# Patient Record
Sex: MALE | Race: BLACK OR AFRICAN AMERICAN | NOT HISPANIC OR LATINO | Employment: FULL TIME | URBAN - METROPOLITAN AREA
[De-identification: names, ages, dates, MRNs, and addresses within clinical notes are randomized per-mention and may not be internally consistent; named-entity substitution may affect disease eponyms.]

---

## 2022-11-11 ENCOUNTER — HOSPITAL ENCOUNTER (EMERGENCY)
Facility: HOSPITAL | Age: 42
Discharge: HOME | End: 2022-11-11
Admitting: PHYSICIAN ASSISTANT
Payer: OTHER MISCELLANEOUS

## 2022-11-11 VITALS
OXYGEN SATURATION: 97 % | SYSTOLIC BLOOD PRESSURE: 143 MMHG | HEIGHT: 68 IN | TEMPERATURE: 98.4 F | HEART RATE: 88 BPM | DIASTOLIC BLOOD PRESSURE: 80 MMHG | RESPIRATION RATE: 12 BRPM | BODY MASS INDEX: 22.39 KG/M2 | WEIGHT: 147.71 LBS

## 2022-11-11 DIAGNOSIS — M54.50 ACUTE BILATERAL LOW BACK PAIN WITHOUT SCIATICA: ICD-10-CM

## 2022-11-11 PROCEDURE — 99284 EMERGENCY DEPT VISIT MOD MDM: CPT | Mod: 25

## 2022-11-11 PROCEDURE — 250N000011 HC RX IP 250 OP 636: Performed by: PHYSICIAN ASSISTANT

## 2022-11-11 PROCEDURE — 250N000013 HC RX MED GY IP 250 OP 250 PS 637: Performed by: PHYSICIAN ASSISTANT

## 2022-11-11 PROCEDURE — 96372 THER/PROPH/DIAG INJ SC/IM: CPT | Performed by: PHYSICIAN ASSISTANT

## 2022-11-11 RX ORDER — CYCLOBENZAPRINE HCL 10 MG
10 TABLET ORAL 3 TIMES DAILY PRN
Qty: 20 TABLET | Refills: 0 | Status: SHIPPED | OUTPATIENT
Start: 2022-11-11 | End: 2022-11-18

## 2022-11-11 RX ORDER — LIDOCAINE 4 G/G
1 PATCH TOPICAL
Status: DISCONTINUED | OUTPATIENT
Start: 2022-11-11 | End: 2022-11-11 | Stop reason: HOSPADM

## 2022-11-11 RX ORDER — KETOROLAC TROMETHAMINE 15 MG/ML
15 INJECTION, SOLUTION INTRAMUSCULAR; INTRAVENOUS ONCE
Status: COMPLETED | OUTPATIENT
Start: 2022-11-11 | End: 2022-11-11

## 2022-11-11 RX ADMIN — LIDOCAINE PATCH 4% 1 PATCH: 40 PATCH TOPICAL at 11:32

## 2022-11-11 RX ADMIN — KETOROLAC TROMETHAMINE 15 MG: 15 INJECTION, SOLUTION INTRAMUSCULAR; INTRAVENOUS at 11:33

## 2022-11-11 ASSESSMENT — ENCOUNTER SYMPTOMS
BACK PAIN: 1
ABDOMINAL PAIN: 0
CHILLS: 1
HEMATURIA: 0
FEVER: 1
FREQUENCY: 0
DIFFICULTY URINATING: 0
DYSURIA: 0
ROS GI COMMENTS: NEGATIVE FOR INCONTINENCE.

## 2022-11-11 ASSESSMENT — ACTIVITIES OF DAILY LIVING (ADL): ADLS_ACUITY_SCORE: 35

## 2022-11-11 NOTE — ED TRIAGE NOTES
Pt had  A box hit his low back about 5 monthsago and he has had varying degrees of low back pain since then but for the last 2 days his low back pain has been 8/10. Tylenol has not been helping much.

## 2022-11-11 NOTE — Clinical Note
Bebeto Herring was seen and treated in our emergency department on 11/11/2022.  He may return to work on 11/12/2022.       If you have any questions or concerns, please don't hesitate to call.      Jose Rutherford PA-C

## 2022-11-11 NOTE — ED NOTES
Patient presents here with low back pain that has occurred over the past 5-6 months following an injury to the site in which a box fell and struck him in the back. He locates the pain to the left lower lumbar area. He notes that movement aggravates the pain. It remains localized with no loss of bowel or bladder function.

## 2022-11-11 NOTE — ED PROVIDER NOTES
EMERGENCY DEPARTMENT ENCOUNTER      NAME: Bebeto Herring  AGE: 42 year old male  YOB: 1980  MRN: [id number]  EVALUATION DATE & TIME: No admission date for patient encounter.    PCP: No primary care provider on file.    ED PROVIDER: Jose Rutherford PA-C      Chief Complaint   Patient presents with     Back Pain       FINAL IMPRESSION:  1. Acute bilateral low back pain without sciatica      ED COURSE & MEDICAL DECISION MAKING:    Pertinent Labs & Imaging studies reviewed. (See chart for details)  10:42 AM I met the patient and performed my initial interview and exam.   12:22 PM We discussed the plan for discharge and the patient is agreeable. Reviewed supportive cares, symptomatic treatment, outpatient follow up, and reasons to return to the Emergency Department. All questions and concerns were addressed. Patient to be discharged by ED RN.       42 year old male presents to the Emergency Department for evaluation of bilateral low back pain    ED Course as of 11/11/22 1333   Fri Nov 11, 2022   1050 Patient is a 42-year-old male, no significant past medical history, who presents emergency department for evaluation of low back pain.  Patient notes that he is on off-and-on back pain for the last 5 months, however is been worse last 3 days.  He is not taking Tylenol at home, last Tylenol was around 6 PM last night.  He does note that Tylenol helps with the pain.  He has no numbness or tingling.  No red flag neurological signs.  No concern for cauda equina.  No fevers to be suggestive of spinal abscess.  No previous surgeries, or other injuries.  No history of kidney stones.  On examination he does not have any midline tenderness.  No bony crepitus or deformity.  No nausea or vomiting.  No abdominal pain.  No CVA tenderness.  Differential at this point includes musculoskeletal back pain, muscle spasm.  Unlikely to be intra-abdominal etiology, or urinary.  Plan will be for pain medications here in the  emergency department including Toradol, lidocaine patch.  Did discuss sending him home with some muscle relaxers as an outpatient which she can use at home.  Patient is agreeable with this plan.  Plan for Toradol, lidocaine patch, disposition   1224 Patient re-evaluated, pain improved after medications.  SPECT this is all musculoskeletal.  Plan for discharge, follow-up with primary care.  Patient is agreeable with this plan.  We will provide a prescription for some muscle relaxers.  Patient will also be given a work note.  Plan for discharge.        At the conclusion of the encounter I discussed the results of all of the tests and the disposition. The questions were answered. The patient or family acknowledged understanding and was agreeable with the care plan.       0 minutes of critical care time     MEDICATIONS GIVEN IN THE EMERGENCY:  Medications   Lidocaine (LIDOCARE) 4 % Patch 1 patch (1 patch Transdermal Patch/Med Applied 11/11/22 1132)   lidocaine patch in PLACE (has no administration in time range)   ketorolac (TORADOL) injection 15 mg (15 mg Intramuscular Given 11/11/22 1133)       NEW PRESCRIPTIONS STARTED AT TODAY'S ER VISIT  New Prescriptions    CYCLOBENZAPRINE (FLEXERIL) 10 MG TABLET    Take 1 tablet (10 mg) by mouth 3 times daily as needed for muscle spasms          =================================================================    HPI    Patient information was obtained from: patient     Use of : N/A         Bebeto Herring is a 42 year old male with no pertinent history on file who presents to this ED by private car for evaluation of back pain.    The patient reports low back pain since ~5 months ago when a box fell out of a truck onto his back. His pain has worsened significantly in the last 2 days, prompting his presentation to the ED. Hi reilly is localized to his bilateral lower back. He denies any midline pain. The patient has been taking Tylenol with some relief. Last took Tylenol  "yesterday. He notes he avoids ibuprofen due to history of gastric problems. The patient denies any urinary changes, bladder or bowel incontinence, numbness, tingling, or abdominal pain. He notes he did have a subjective fever yesterday. Patient denies additional medical concerns or complaints at this time.        REVIEW OF SYSTEMS   Review of Systems   Constitutional: Positive for chills and fever (yesterday).   Gastrointestinal: Negative for abdominal pain.        Negative for incontinence.   Genitourinary: Negative for difficulty urinating, dysuria, enuresis, frequency and hematuria.        Negative for incontinence.   Musculoskeletal: Positive for back pain (lower).   All other systems reviewed and are negative.       PAST MEDICAL HISTORY:  History reviewed. No pertinent past medical history.    PAST SURGICAL HISTORY:  History reviewed. No pertinent surgical history.        CURRENT MEDICATIONS:    cyclobenzaprine (FLEXERIL) 10 MG tablet         ALLERGIES:  No Known Allergies    FAMILY HISTORY:  History reviewed. No pertinent family history.    SOCIAL HISTORY:        VITALS:  BP (!) 143/80   Pulse 88   Temp 98.4  F (36.9  C) (Tympanic)   Resp 12   Ht 1.72 m (5' 7.72\")   Wt 67 kg (147 lb 11.3 oz)   SpO2 97%   BMI 22.65 kg/m      PHYSICAL EXAM    Physical Exam  Vitals and nursing note reviewed.   Constitutional:       General: He is not in acute distress.     Appearance: Normal appearance. He is normal weight. He is not toxic-appearing or diaphoretic.   HENT:      Head: Normocephalic.      Right Ear: External ear normal.      Left Ear: External ear normal.   Cardiovascular:      Rate and Rhythm: Normal rate and regular rhythm.      Heart sounds: Normal heart sounds. No murmur heard.    No friction rub. No gallop.   Pulmonary:      Effort: Pulmonary effort is normal. No respiratory distress.      Breath sounds: No wheezing.   Abdominal:      General: Abdomen is flat. Bowel sounds are normal. There is no " distension.      Palpations: Abdomen is soft.      Tenderness: There is no abdominal tenderness. There is no right CVA tenderness, left CVA tenderness, guarding or rebound.   Musculoskeletal:         General: Tenderness present. No swelling or deformity.      Cervical back: No bony tenderness.      Thoracic back: No bony tenderness.      Lumbar back: Tenderness present. No bony tenderness.      Comments: Mild bilateral lower lumbar tenderness, right over the iliac crest, sacral prominence.  No midline bony tenderness.  No crepitus or deformity.   Skin:     General: Skin is warm.   Neurological:      Mental Status: He is alert. Mental status is at baseline.      Sensory: No sensory deficit.      Motor: No weakness.        LAB:  All pertinent labs reviewed and interpreted.  Labs Ordered and Resulted from Time of ED Arrival to Time of ED Departure - No data to display    RADIOLOGY:  Reviewed all pertinent imaging. Please see official radiology report.  No orders to display       Anita GASPAR =, am serving as a scribe to document services personally performed by Jose Rutherford PA-C, based on my observation and the provider's statements to me. IJose PA-C, attest that Anita Rubi is acting in a scribe capacity, has observed my performance of the services and has documented them in accordance with my direction.    Jose Rutherford PA-C  Emergency Medicine  [facility] EMERGENCY DEPARTMENT  [facility] [facility] [address]  [phone]  Dept: [phone]     Jose Rutherford PA-C  11/11/22 6790

## 2022-11-11 NOTE — DISCHARGE INSTRUCTIONS
You were seen here in the emergency department for evaluation of low back pain.  We gave you some medications to help with your low back pain.  I recommend ibuprofen and Tylenol at home.  Please follow-up with your primary care doctor.  Additionally we will send you with some muscle relaxers, take these at night, not while you are driving as it make you sleepy.    Please follow-up with primary care as indicated.    For pain or fever you may use:  -Tylenol 650 mg every 6 hours.  Max 4000 mg in 24 hours  Do not use thismedication with alcohol as it can cause liver problems.  -Ibuprofen 600 mg every 6 hours.  Max 3500 mg in 24 hours  Do not take this medication if you have a history of a GI bleed or have kidney problems.  You may use both of these medications at the same time or you can alternate them every 3 hours.  For example, Tylenol at 6 AM, ibuprofen at 9 AM, Tylenol at noon, etc.